# Patient Record
Sex: MALE | Race: WHITE
[De-identification: names, ages, dates, MRNs, and addresses within clinical notes are randomized per-mention and may not be internally consistent; named-entity substitution may affect disease eponyms.]

---

## 2018-08-14 ENCOUNTER — HOSPITAL ENCOUNTER (OUTPATIENT)
Dept: HOSPITAL 53 - M ONCR | Age: 68
End: 2018-08-14
Attending: RADIOLOGY
Payer: MEDICARE

## 2018-08-14 DIAGNOSIS — C61: Primary | ICD-10-CM

## 2018-09-17 ENCOUNTER — HOSPITAL ENCOUNTER (OUTPATIENT)
Dept: HOSPITAL 53 - M ONCR | Age: 68
LOS: 13 days | End: 2018-09-30
Attending: RADIOLOGY
Payer: MEDICARE

## 2018-09-17 DIAGNOSIS — C61: Primary | ICD-10-CM

## 2018-09-17 PROCEDURE — 77300 RADIATION THERAPY DOSE PLAN: CPT

## 2018-10-02 NOTE — RADONC
RADIATION ONCOLOGY PROGRESS NOTE

 

DATE: 10/01/2018

 

CHART NUMBER: 

 

Mr. Obrien is presently at a dose of 900 cGy to his prostate and is tolerating

treatments quite well at this point with no complaints related to his radiation

therapy.  He is having no urinary or bowel difficulties and no bone pain.

 

The patient's review of systems is noncontributory.  He denies nausea, vomiting,

fevers, chills, night sweats, diplopia, headaches, anxiety or depression,

anorexia, weight loss, visual disturbances, chest pain, urinary or bowel

difficulties, bone pain, or neurological problems.

 

PHYSICAL EXAMINATION:

The patient's skin is in good condition with no evidence of radiation change

present.  There is no moist or dry desquamation.  The remainder of his physical

exam remains unchanged.

 

Mr. Obrien is tolerating treatments quite well and radiation will continue as

scheduled.

## 2018-10-10 NOTE — RADONC
RADIATION ONCOLOGY PROGRESS NOTE

 

DATE:  10/08/2018

 

CHART #:  

 

Mr. Obrien is presently at a dose of 1800 cGy to his prostate and is tolerating

treatments quite well at this point with no complaints related to his radiation

therapy.  He is having no urinary or bowel difficulties and no bone pain.

 

REVIEW OF SYSTEMS:

The patient's review of systems is noncontributory.  Denies nausea, vomiting,

fevers, chills, night sweats, diplopia, headaches, anxiety or depression,

anorexia, weight loss, visual disturbances, chest pain, urinary or bowel

difficulties, bone pain, or neurological problems.

 

PHYSICAL EXAMINATION:

The patient's skin is in good condition with no evidence of moist or dry

desquamation.  The remainder of the physical exam remains unchanged.

 

Mr. Obrien is tolerating treatments quite well and radiation will continue as

scheduled.

## 2018-10-16 NOTE — RADONC
RADIATION ONCOLOGY PROGRESS NOTE:

 

DATE:  10/15/2018

 

CHART NUMBER:  

 

PROGRESS NOTE:

Mr. Obrien is presently at a dose of 2700 cGy to his prostate and is tolerating

treatments quite well at this point with no complaints related to his radiation

therapy.  He is having no urinary or bowel difficulties and no bone pain.

 

REVIEW OF SYSTEMS:

The patient's review of systems is noncontributory.  Denies nausea, vomiting,

fevers, chills, night sweats, diplopia, headaches, anxiety or depression,

anorexia, weight loss, visual disturbances, chest pain, urinary or bowel

difficulties, bone pain, or neurological problems.

 

PHYSICAL EXAMINATION:

The patient's skin is in good condition with no evidence of radiation change

present.  There is no moist or dry desquamation.  The remainder of his physical

exam remains unchanged.

 

Mr. Obrien is tolerating treatments quite well and radiation will continue as

scheduled.

## 2018-10-23 NOTE — RADONC
RADIATION ONCOLOGY PROGRESS NOTE:

 

DATE:  10/23/2018

 

CHART NUMBER:  

 

PROGRESS NOTE:

Mr. Obrien is presently at a dose of 3600 cGy to his prostate and is tolerating

treatments quite well at this point with no complaints related to his radiation

therapy.  He is having no urinary or bowel difficulties and no bone pain.

 

REVIEW OF SYSTEMS:

The patient's review of systems is noncontributory.  Denies nausea, vomiting,

fevers, chills, night sweats, diplopia, headaches, anxiety or depression,

anorexia, weight loss, visual disturbances, chest pain, urinary or bowel

difficulties, bone pain, or neurological problems.

 

PHYSICAL EXAMINATION:

The patient's skin is in good condition with no evidence of radiation change

present.  There is no moist or dry desquamation.  The remainder of his physical

exam remains unchanged.

 

Ms. Obrien is tolerating treatments quite well and radiation will continue as

scheduled.

## 2018-10-30 NOTE — RADONC
RADIATION ONCOLOGY PROGRESS NOTE

 

DATE:  10/29/2018

 

CHART NUMBER: 

 

PROGRESS NOTE:

Mr. Obrien with a diagnosis of adenocarcinoma of the prostate is currently

receiving local regional radiotherapy.  His current dose of radiotherapy is 4500

cGy of an anticipated 7927 cGy.  Thus far he is tolerating his radiotherapy quite

well and denies any nausea, vomiting, diarrhea, dysuria, hematuria or blood per

rectum.  His energy level is such that he is able to maintain most day-to-day

activities without any alteration of his lifestyle.

 

EXAMINATION FINDINGS:

The skin within the irradiated volume looks normal without evidence of erythema

or desquamation.

 

Lymphatics: No palpable peripheral lymphadenopathy.  The remainder of the

physical examination is unchanged.

 

IMPRESSION:

Tolerating therapy quite well.

 

PLAN:

Treatments to continue.

## 2018-10-31 ENCOUNTER — HOSPITAL ENCOUNTER (OUTPATIENT)
Dept: HOSPITAL 53 - M ONCR | Age: 68
End: 2018-10-31
Attending: RADIOLOGY
Payer: MEDICARE

## 2018-10-31 DIAGNOSIS — Z88.2: ICD-10-CM

## 2018-10-31 DIAGNOSIS — C61: Primary | ICD-10-CM

## 2018-10-31 DIAGNOSIS — Z88.0: ICD-10-CM

## 2018-11-01 ENCOUNTER — HOSPITAL ENCOUNTER (OUTPATIENT)
Dept: HOSPITAL 53 - M ONCR | Age: 68
LOS: 29 days | End: 2018-11-30
Attending: RADIOLOGY
Payer: MEDICARE

## 2018-11-01 DIAGNOSIS — C61: Primary | ICD-10-CM

## 2018-11-01 PROCEDURE — 77300 RADIATION THERAPY DOSE PLAN: CPT

## 2018-12-20 ENCOUNTER — HOSPITAL ENCOUNTER (OUTPATIENT)
Dept: HOSPITAL 53 - M ONCR | Age: 68
End: 2018-12-20
Attending: RADIOLOGY
Payer: MEDICARE

## 2018-12-20 DIAGNOSIS — C61: Primary | ICD-10-CM

## 2018-12-21 NOTE — RADONC
RADIATION ONCOLOGY FOLLOWUP NOTE

 

DATE:  12/20/2018

 

CHART NUMBER:  

 

DIAGNOSIS:  Prostate cancer.

 

STAGE:  II B, [id number].

 

ECOG PERFORMANCE STATUS:  Zero.

 

FOLLOWUP NOTE:

Mr. Obrien is a very pleasant, 68-year-old white male with the diagnosis of a

stage II B, [id number], moderate to poorly differentiated Armando score 7 (3-4 )

adenocarcinoma of prostate with a PSA level of 8 who is presenting to us today

for routine followup visit 1 month post completion of external beam radiation

therapy.

 

The patient presents today reporting that he is doing quite well with no

complaints at this time related to his radiation therapy or disease.  He has no

urinary or bowel difficulties and no bone pain.

 

REVIEW OF SYSTEMS:

The patient's review of systems is noncontributory.  Denies nausea, vomiting,

fevers, chills, night sweats, diplopia, headaches, anxiety or depression,

anorexia, weight loss, visual disturbances, chest pain, urinary or bowel

difficulties, bone pain, or neurological problems.

 

PHYSICAL EXAMINATION:

The patient is a well-developed, well-nourished male in no acute distress.

HEENT exam is normocephalic, atraumatic.  Extraocular movements are intact.

There is no palpable cervical, supraclavicular, infraclavicular, axillary, or

inguinal lymphadenopathy present.

Lungs are clear to auscultation and percussion.

Heart has a regular rate and rhythm.

Abdomen is benign with no hepatosplenomegaly, masses, or tenderness.

Rectal examination reveals a normal anal sphincter tone.  His prostate is smooth

with no evidence of nodularity.

Skeletal examination reveals no tenderness to pressure or percussion of the bony

skeleton.

Extremities reveal no clubbing, cyanosis, or edema.

Neurologic exam is grossly intact, as is the remainder of the physical

examination.

 

ASSESSMENT:

The patient is clinically MARINA at this time.  He is being followed and managed

closely by his urologist, Dr. Raudel Tucker at [facility].  In light of this,

I am discharging him from our followup except on a p.r.n. basis.

 

 

 

 

 

cc:    MD Jonny Schumacher MD Daniel R. Welchons, AMP

## 2022-09-26 ENCOUNTER — HOSPITAL ENCOUNTER (OUTPATIENT)
Dept: HOSPITAL 53 - M SFHCDERM | Age: 72
End: 2022-09-26
Attending: PHYSICIAN ASSISTANT
Payer: MEDICARE

## 2022-09-26 DIAGNOSIS — C44.629: Primary | ICD-10-CM
